# Patient Record
Sex: FEMALE | Race: WHITE | Employment: STUDENT | URBAN - METROPOLITAN AREA
[De-identification: names, ages, dates, MRNs, and addresses within clinical notes are randomized per-mention and may not be internally consistent; named-entity substitution may affect disease eponyms.]

---
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
## 2024-04-18 LAB
ALBUMIN SERPL-MCNC: 4.3 G/DL (ref 4–5)
ALBUMIN/GLOB SERPL: 1.6 {RATIO} (ref 1.2–2.2)
ALP SERPL-CCNC: 58 IU/L (ref 42–106)
ALT SERPL-CCNC: 10 IU/L (ref 0–32)
AST SERPL-CCNC: 23 IU/L (ref 0–40)
BILIRUB SERPL-MCNC: 0.6 MG/DL (ref 0–1.2)
BUN SERPL-MCNC: 12 MG/DL (ref 6–20)
BUN/CREAT SERPL: 14 (ref 9–23)
CALCIUM SERPL-MCNC: 9.7 MG/DL (ref 8.7–10.2)
CHLORIDE SERPL-SCNC: 103 MMOL/L (ref 96–106)
CO2 SERPL-SCNC: 22 MMOL/L (ref 20–29)
CREAT SERPL-MCNC: 0.86 MG/DL (ref 0.57–1)
EGFRCR SERPLBLD CKD-EPI 2021: 99 ML/MIN/1.73
ERYTHROCYTE [DISTWIDTH] IN BLOOD BY AUTOMATED COUNT: 11.5 % (ref 11.7–15.4)
FERRITIN SERPL-MCNC: 33 NG/ML (ref 15–150)
GLOBULIN SER CALC-MCNC: 2.7 G/DL (ref 1.5–4.5)
GLUCOSE SERPL-MCNC: 87 MG/DL (ref 70–99)
HBA1C MFR BLD: 5.2 % (ref 4.8–5.6)
HCT VFR BLD AUTO: 42 % (ref 34–46.6)
HGB BLD-MCNC: 13.7 G/DL (ref 11.1–15.9)
IRON SATN MFR SERPL: 32 % (ref 15–55)
IRON SERPL-MCNC: 150 UG/DL (ref 27–159)
MCH RBC QN AUTO: 28.7 PG (ref 26.6–33)
MCHC RBC AUTO-ENTMCNC: 32.6 G/DL (ref 31.5–35.7)
MCV RBC AUTO: 88 FL (ref 79–97)
PLATELET # BLD AUTO: 263 X10E3/UL (ref 150–450)
POTASSIUM SERPL-SCNC: 4.1 MMOL/L (ref 3.5–5.2)
PROT SERPL-MCNC: 7 G/DL (ref 6–8.5)
RBC # BLD AUTO: 4.78 X10E6/UL (ref 3.77–5.28)
SODIUM SERPL-SCNC: 140 MMOL/L (ref 134–144)
TIBC SERPL-MCNC: 469 UG/DL (ref 250–450)
TSH SERPL DL<=0.005 MIU/L-ACNC: 1.53 UIU/ML (ref 0.45–4.5)
UIBC SERPL-MCNC: 319 UG/DL (ref 131–425)
WBC # BLD AUTO: 3.9 X10E3/UL (ref 3.4–10.8)

## 2024-04-19 ENCOUNTER — OFFICE VISIT (OUTPATIENT)
Age: 19
End: 2024-04-19

## 2024-04-19 VITALS
RESPIRATION RATE: 18 BRPM | HEIGHT: 64 IN | BODY MASS INDEX: 22.2 KG/M2 | TEMPERATURE: 98.6 F | OXYGEN SATURATION: 99 % | HEART RATE: 88 BPM | WEIGHT: 130 LBS | SYSTOLIC BLOOD PRESSURE: 120 MMHG | DIASTOLIC BLOOD PRESSURE: 90 MMHG

## 2024-04-19 DIAGNOSIS — R42 DIZZY SPELLS: Primary | ICD-10-CM

## 2024-04-19 LAB
MONONUCLEOSIS SCREEN POC: NEGATIVE
VALID INTERNAL CONTROL: YES

## 2024-04-19 ASSESSMENT — ENCOUNTER SYMPTOMS
NAUSEA: 0
CHEST TIGHTNESS: 0
COUGH: 0
SINUS PRESSURE: 0
DIARRHEA: 0
VOMITING: 0
ABDOMINAL PAIN: 0
SORE THROAT: 0

## 2024-04-19 NOTE — PROGRESS NOTES
visit on 04/19/24   AMB POC MONO TEST   Result Value Ref Range    VALID INTERNAL CONTROL yes     Mononucleosis Screen, POC negative           ASSESSMENT and PLAN         Diagnosis Orders   1. Dizzy spells  AMB POC MONO TEST            Recommendations: Reviewed CMP, CBC, iron panel, TSH, A1C, ferritin results. RDW 11.5 (mild low); TIBC 469 (mild elevation)- h/h, iron saturation, ferritin, MCV/MCH and all other values otherwise within range. Monospot negative today. Spoke with Berhane, this morning and suggested cardiac workup through [facility] sports dept and further evaluation before any further sports clearance. Student aware and verbalizes understanding/agrees. Underscored importance of reporting any health s/s immediately to [facility] sports dept for further evaluation. Also advised to schedule appt with home Primary Care Provider ASAP for further discussion /evaluation since semester ends in 2 weeks.             Follow up: Patient was encouraged to return to the clinic and/or PCP if s/s persist or do not resolve completely. Also advised to call 911/go to ER if new or worsening signs and symptoms which warrant immediate evaluation including, but not limited to: increased or persistent vomiting or diarrhea, increased or consistent headache, vision changes, speech disturbance, difficulty with ambulation/gait, numbness, tingling, weakness, fever (100.4 or higher), syncope, neck stiffness, abdominal pain, chest pain, shortness of breath, changes in mental alertness or increased/evolving symptoms for which you were seen.    *Counseling provided on benefits of having a primary care provider which includes, but is not limited to, continuity of care and having a medical home when routine and emergent health needs arise. Also reminded patient that onsite clinic policy states that we are not to take the place of a primary care provider. Patient verbalized understanding.     *Side effects, adverse effects, any black box